# Patient Record
Sex: MALE
[De-identification: names, ages, dates, MRNs, and addresses within clinical notes are randomized per-mention and may not be internally consistent; named-entity substitution may affect disease eponyms.]

---

## 2014-08-08 VITALS
WEIGHT: 163 LBS | HEART RATE: 84 BPM | HEIGHT: 68.75 IN | SYSTOLIC BLOOD PRESSURE: 120 MMHG | DIASTOLIC BLOOD PRESSURE: 76 MMHG | BODY MASS INDEX: 24.14 KG/M2

## 2017-12-21 ENCOUNTER — APPOINTMENT (OUTPATIENT)
Dept: INTERNAL MEDICINE | Facility: CLINIC | Age: 22
End: 2017-12-21
Payer: MEDICAID

## 2017-12-21 VITALS
BODY MASS INDEX: 22.9 KG/M2 | HEIGHT: 70 IN | WEIGHT: 160 LBS | HEART RATE: 79 BPM | TEMPERATURE: 98.7 F | RESPIRATION RATE: 17 BRPM | OXYGEN SATURATION: 99 % | DIASTOLIC BLOOD PRESSURE: 80 MMHG | SYSTOLIC BLOOD PRESSURE: 118 MMHG

## 2017-12-21 DIAGNOSIS — Z00.00 ENCOUNTER FOR GENERAL ADULT MEDICAL EXAMINATION W/OUT ABNORMAL FINDINGS: ICD-10-CM

## 2017-12-21 DIAGNOSIS — Z63.79 OTHER STRESSFUL LIFE EVENTS AFFECTING FAMILY AND HOUSEHOLD: ICD-10-CM

## 2017-12-21 PROCEDURE — 99385 PREV VISIT NEW AGE 18-39: CPT

## 2017-12-27 LAB
25(OH)D3 SERPL-MCNC: 18.2 NG/ML
ALBUMIN SERPL ELPH-MCNC: 5.1 G/DL
ALP BLD-CCNC: 61 U/L
ALT SERPL-CCNC: 62 U/L
ANION GAP SERPL CALC-SCNC: 16 MMOL/L
APPEARANCE: CLEAR
AST SERPL-CCNC: 24 U/L
BACTERIA: NEGATIVE
BASOPHILS # BLD AUTO: 0.03 K/UL
BASOPHILS NFR BLD AUTO: 0.5 %
BILIRUB SERPL-MCNC: 0.8 MG/DL
BILIRUBIN URINE: NEGATIVE
BLOOD URINE: NEGATIVE
BUN SERPL-MCNC: 16 MG/DL
C TRACH RRNA SPEC QL NAA+PROBE: NOT DETECTED
CALCIUM SERPL-MCNC: 10.4 MG/DL
CHLORIDE SERPL-SCNC: 98 MMOL/L
CHOLEST SERPL-MCNC: 191 MG/DL
CHOLEST/HDLC SERPL: 3.1 RATIO
CO2 SERPL-SCNC: 27 MMOL/L
COLOR: YELLOW
CREAT SERPL-MCNC: 1.13 MG/DL
EOSINOPHIL # BLD AUTO: 0.15 K/UL
EOSINOPHIL NFR BLD AUTO: 2.3 %
GLUCOSE QUALITATIVE U: NEGATIVE MG/DL
GLUCOSE SERPL-MCNC: 80 MG/DL
HAV IGM SER QL: NONREACTIVE
HBA1C MFR BLD HPLC: 5.2 %
HBV CORE IGM SER QL: NONREACTIVE
HBV SURFACE AG SER QL: NONREACTIVE
HCT VFR BLD CALC: 47.2 %
HCV AB SER QL: NONREACTIVE
HCV S/CO RATIO: 0.07 S/CO
HDLC SERPL-MCNC: 62 MG/DL
HGB BLD-MCNC: 15.9 G/DL
HIV1+2 AB SPEC QL IA.RAPID: NONREACTIVE
HYALINE CASTS: 0 /LPF
IMM GRANULOCYTES NFR BLD AUTO: 0.2 %
KETONES URINE: NEGATIVE
LDLC SERPL CALC-MCNC: 102 MG/DL
LEUKOCYTE ESTERASE URINE: NEGATIVE
LYMPHOCYTES # BLD AUTO: 2.88 K/UL
LYMPHOCYTES NFR BLD AUTO: 43.4 %
MAN DIFF?: NORMAL
MCHC RBC-ENTMCNC: 30.6 PG
MCHC RBC-ENTMCNC: 33.7 GM/DL
MCV RBC AUTO: 90.8 FL
MICROSCOPIC-UA: NORMAL
MONOCYTES # BLD AUTO: 0.48 K/UL
MONOCYTES NFR BLD AUTO: 7.2 %
N GONORRHOEA RRNA SPEC QL NAA+PROBE: NOT DETECTED
NEUTROPHILS # BLD AUTO: 3.08 K/UL
NEUTROPHILS NFR BLD AUTO: 46.4 %
NITRITE URINE: NEGATIVE
PH URINE: 7.5
PLATELET # BLD AUTO: 284 K/UL
POTASSIUM SERPL-SCNC: 4.6 MMOL/L
PROT SERPL-MCNC: 8.1 G/DL
PROTEIN URINE: NEGATIVE MG/DL
RBC # BLD: 5.2 M/UL
RBC # FLD: 13.4 %
RED BLOOD CELLS URINE: 0 /HPF
SODIUM SERPL-SCNC: 141 MMOL/L
SOURCE AMPLIFICATION: NORMAL
SPECIFIC GRAVITY URINE: 1.03
SQUAMOUS EPITHELIAL CELLS: 0 /HPF
T PALLIDUM AB SER QL IA: NEGATIVE
TRIGL SERPL-MCNC: 134 MG/DL
UROBILINOGEN URINE: NEGATIVE MG/DL
WBC # FLD AUTO: 6.63 K/UL
WHITE BLOOD CELLS URINE: 0 /HPF

## 2018-02-02 DIAGNOSIS — M79.643 PAIN IN UNSPECIFIED HAND: ICD-10-CM

## 2018-04-15 ENCOUNTER — HOSPITAL ENCOUNTER (EMERGENCY)
Dept: HOSPITAL 25 - ED | Age: 23
Discharge: HOME | End: 2018-04-15
Payer: COMMERCIAL

## 2018-04-15 VITALS — DIASTOLIC BLOOD PRESSURE: 68 MMHG | SYSTOLIC BLOOD PRESSURE: 121 MMHG
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
## 2018-04-15 DIAGNOSIS — S00.83XA: ICD-10-CM
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
## 2018-04-15 DIAGNOSIS — S01.111A: ICD-10-CM

## 2018-04-15 DIAGNOSIS — S62.396A: Primary | ICD-10-CM

## 2018-04-15 PROCEDURE — 99282 EMERGENCY DEPT VISIT SF MDM: CPT

## 2018-04-15 PROCEDURE — 29125 APPL SHORT ARM SPLINT STATIC: CPT

## 2018-04-15 PROCEDURE — 12011 RPR F/E/E/N/L/M 2.5 CM/<: CPT

## 2018-04-15 PROCEDURE — 70486 CT MAXILLOFACIAL W/O DYE: CPT

## 2018-04-15 NOTE — ED
Adult Trauma





- HPI Summary


HPI Summary: 


Patient here with multiple injuries after accidentally falling down 5-10 steps 

at 10:30 this morning.  He reports he was running quickly down the stairs when 

he lost his footing and started to roll forward.  Tried to catch himself with 

his hands and now has a right swollen and painful hand. Pain is worse with 

moving pinkie and is tender to touch the swollen area.  He denies numbness 

tingling or weakness.  He is able to move his wrist and other fingers without 

pain or restriction although he does note some pain with supination.  He is 

able to move his elbow shoulder and neck as well as the other extremity with 

all joints without pain or restriction.  He admits he hit the Rt side of his 

face on the way down also. He denies loss of consciousness, headache, change in 

vision, photophobia, nausea, tinnitus, epistaxis, dental pain or loose teeth, 

tongue or cheek bite injury, chest pain, abdominal pain, lower extremity pain.  

No change in bowel or bladder habits.  He has a laceration over his right eye 

within the lateral edge of the eyebrow - mild bleeding.  Immunizations are up-to

-date.  Denies pain with ocular movements.  He does have mild right-sided jaw 

discomfort with depression and elevation however he states this is improving 

since he's been here.  His Rt cheek is swollen and tender here and remains so.  

He's been applying ice but has not taken any medication -would like to try 

ibuprofen.  No previous history of head injury.





- History of Current Complaint


Chief Complaint: EDHeadInjury


Stated Complaint: EYE/HAND INJURY


Time Seen by Provider: 04/15/18 11:36


Hx Obtained From: Patient


Pain Intensity: 6





- Allergy/Home Medications


Allergies/Adverse Reactions: 


 Allergies











Allergy/AdvReac Type Severity Reaction Status Date / Time


 


No Known Allergies Allergy   Verified 04/15/18 11:38











Home Medications: 


 Home Medications





NK [No Home Medications Reported]  04/15/18 [History Confirmed 04/15/18]











PMH/Surg Hx/FS Hx/Imm Hx


Previously Healthy: Yes


Endocrine/Hematology History: 


   Denies: Hx Anticoagulant Therapy, Hx Blood Disorders, Hx Unexplained Bleeding

, Autoimmune Disease





- Immunization History


Immunizations Up to Date: Yes


Infectious Disease History: No


Infectious Disease History: 


   Denies: Hx of Known/Suspected MRSA, Traveled Outside the [place] in Last 30 Days





- Family History


Known Family History: Positive: Diabetes - father





- Social History


Occupation: Student


Lives: Dormitory/Roommates


Alcohol Use: Occasionally - couple of times a month


Hx Substance Use: No


Substance Use Type: Reports: None


Hx Tobacco Use: No


Smoking Status (MU): Never Smoked Tobacco





Review of Systems


Constitutional: Negative


Negative: Fatigue


Eyes: Negative


Negative: Photophobia, Blurred Vision, Diplopia


ENT: Other - Rt cheek pain


Negative: Epistaxis, Dental Pain, Ear Ache, Nasal Discharge


Cardiovascular: Negative


Respiratory: Negative


Gastrointestinal: Negative


Positive: no symptoms reported


Positive: Arthralgia, Myalgia, Decreased ROM, Edema


Positive: Bruising


Neurological: Negative


Psychological: Normal


All Other Systems Reviewed And Are Negative: Yes





Physical Exam


Triage Information Reviewed: Yes


Vital Signs On Initial Exam: 


 Initial Vitals











Temp Pulse Resp BP Pulse Ox


 


 98 F   96   16   119/79   98 


 


 04/15/18 11:00  04/15/18 11:00  04/15/18 11:00  04/15/18 11:00  04/15/18 11:00











Vital Signs Reviewed: Yes


Appearance: Positive: Well-Appearing, Well-Nourished, Pain Distress - mild


Skin: Positive: Warm, Skin Color Reflects Adequate Perfusion - lacertion oozing 

blood over Rt supraorbital ridge within eyebrow; ecchymosis w/ edema over Rt 

5th MC area - no skin breakdown here


Head/Face: Positive: Other - Rt zygomatic edema - TTP;


Eyes: Positive: Normal, EOMI - No pain with ocular movement, KEKE - No 

photophobia, Conjunctiva Clear


ENT: Positive: Normal ENT inspection, Hearing grossly normal, Pharynx normal, 

TMs normal - No hemotympanum.  Negative: Nasal drainage - No signs of epistaxis


Dental: Negative: Dental Fracture @


Neck: Positive: Supple, Nontender - Full range of motion without pain or 

restriction


Respiratory/Lung Sounds: Positive: Clear to Auscultation, Breath Sounds 

Present.  Negative: Stridor, Tracheal Deviation, Wheezes


Cardiovascular: Positive: Normal, RRR, Pulses are Symmetrical in both Upper and 

Lower Extremities


Abdomen Description: Positive: Nontender, No Organomegaly, Soft


Bowel Sounds: Positive: Present


Musculoskeletal: Positive: Pain @ - Right fifth metacarpal edematous and tender 

to palpationpain with movement of phalanges at the MCP joint - otherwise no 

gross deformity about the phalange remaining in the anatomy of the hand or wrist

; mild discomfort with supination otherwise full range of motion of wrist 

without restriction; full range of motion elbow and shoulder without pain or 

restriction in both of these areas including bone in between nontender to 

palpation and without edema or deformity


Neurological: Positive: Normal, Sensory/Motor Intact, Alert, Oriented to Person 

Place, Time, CN Intact II-III


Psychiatric: Positive: Normal





Procedures





- Splinting


Location: Rt hand


Hand-Made Type: fiberglass


Splint: ulnar - gutter


Pre-Proc Neuro Vasc Exam: normal


Post-Proc Neuro Vasc Exam: normal





- Laceration/Wound Repair


  ** 1


Location: face - Rt eyebrow


Description: Linear


Anesthesia: Local, Lido


Length, Depth and Shape: 2cm x 3 mm


Betadine Prep?: No - chlorhexadine


Irrigated w/ Saline (ccs): 250


Laceration/Wound Explored: clean


Closure: Single Layer


Suture Type: Prolene - 6-0


Number of Sutures: 4


Layer Closure?: No


Sterile Dressing Applied?: Yes - triple anbx ointment -hemodynamically stable - 

patient tolerated well





Diagnostics





- Vital Signs


 Vital Signs











  Temp Pulse Resp BP Pulse Ox


 


 04/15/18 11:00  98 F  96  16  119/79  98














- Laboratory


Lab Statement: Any lab studies that have been ordered have been reviewed, and 

results considered in the medical decision making process.





Re-Evaluation





- Re-Evaluation


  ** First Eval


Change: Improved





Adult Trauma Course/Dx





- Course


Course Of Treatment: Right hand x-ray reveals distal fifth metacarpal fracture 

with about 34 dorsal angulation.  Neurovascularly intact.  CT report reveals 

soft tissue edema but no fracture, dislocation or hemorrhage





- Diagnoses


Provider Diagnoses: 


 Fall down stairs, Facial contusion, Laceration of eyebrow, right, Closed 

fracture of fifth metacarpal bone of right hand








Discharge





- Sign-Out/Discharge


Documenting (check all that apply): Discharge





- Discharge Plan


Condition: Stable


Disposition: HOME


Patient Education Materials:  Care For Your Stitches (ED), Hand Fracture (ED), 

Splint Care (ED), Facial Laceration (ED)


Forms:  *School Release


Referrals: 


Nadya Devries MD [Medical Doctor] - 


[facility] [place] - Rizwan GILLILAND [Medical Doctor] - 


Additional Instructions: 


Facial injury:


Gently wash wound with soap and water, rinse well and pat dry with clean cloth.

  Reapply triple antibiotic ointment and clean gauze dressing.  Continue this 

daily until sutures are removed in 5 days.  Call your [facility] [place] to 

schedule wound recheck and suture removal in 5 days. 





For pain and swelling, you may apply ice and take ibuprofen with food as needed 

for pain.





* If you develop redness, swelling, streaking, purulent drainage, fevers or 

chills, seek medical attention sooner or return to the emergency department.














Hand injury:


REST, ICE, ELEVATE AND KEEP SPLINT CLEAN, DRY AND IN PLACE UNTIL SEEN BY 

ORTHOPEDICS. Call orthopedics tomorrow to schedule follow-up. Contact 

information included here.





You may take ibuprofen alternating with acetaminophen as needed for pain





*If you develop numbness, tingling, weakness, swelling or skin discoloration, 

loosen ACE wrap and elevate arm for 20 minutes. If symptoms persist, return to 

ED








- Billing Disposition and Condition


Condition: STABLE


Disposition: HOME

## 2018-04-15 NOTE — RAD
indication:  Swelling and pain of the right zygoma after falling down a flight of stairs.



COMPARISON:  None



A CT scan of the maxillofacial bones was performed without intravenous contrast

enhancement.  Contiguous axial sections were obtained from the level of the hyoid bone to

just above the frontal sinuses.



Findings:



There is mild induration of the subcutaneous tissues surrounding the right orbit and

overlying the right zygoma.



Bones: There is no displaced fracture or dislocation. The orbital rim is intact.

Bilaterally the nasal bones are intact. The zygomatic arch is intact. The pterygoid plates

are intact.



Orbits: The globes are round. The optic nerves are symmetric. The extraocular musculature

is normal. There is no post septal or intraconal inflammatory change. There is no

retrobulbar hematoma.



Paranasal Sinuses: There is mild mucosal thickening of the right worse than left maxillary

sinuses.



Visualized brain: The limited views of the brain do not demonstrate any acute abnormality

or extra-axial hemorrhage.



IMPRESSION:

Soft tissue swelling and induration surrounding the right orbit and overlying the right

zygoma without underlying fracture or dislocation.

## 2018-04-15 NOTE — RAD
INDICATION: Right hand pain after a fall down the stairs



COMPARISON: None.



TECHNIQUE: 4 views of the right hand were obtained.



FINDINGS:



Depicted best on the oblique view images there is fracture at the distal metaphysis of the

right fifth metacarpal exhibiting approximately 34 degrees of dorsal angulation. The

remaining visualized bones are intact and appropriately aligned.





IMPRESSION:  Fracture at the distal pole of the right fifth metacarpal.

## 2018-04-18 PROBLEM — M79.643 HAND PAIN: Status: ACTIVE | Noted: 2018-04-18

## 2018-04-20 ENCOUNTER — APPOINTMENT (OUTPATIENT)
Dept: ORTHOPEDIC SURGERY | Facility: CLINIC | Age: 23
End: 2018-04-20

## 2018-04-23 ENCOUNTER — APPOINTMENT (OUTPATIENT)
Dept: ORTHOPEDIC SURGERY | Facility: CLINIC | Age: 23
End: 2018-04-23

## 2018-04-23 ENCOUNTER — APPOINTMENT (OUTPATIENT)
Dept: ORTHOPEDIC SURGERY | Facility: CLINIC | Age: 23
End: 2018-04-23
Payer: MEDICAID

## 2018-04-23 VITALS — WEIGHT: 160 LBS | HEIGHT: 70 IN | BODY MASS INDEX: 22.9 KG/M2

## 2018-04-23 VITALS — BODY MASS INDEX: 24.25 KG/M2 | WEIGHT: 160 LBS | HEIGHT: 68 IN

## 2018-04-23 VITALS — SYSTOLIC BLOOD PRESSURE: 124 MMHG | HEART RATE: 69 BPM | DIASTOLIC BLOOD PRESSURE: 80 MMHG

## 2018-04-23 DIAGNOSIS — Z78.9 OTHER SPECIFIED HEALTH STATUS: ICD-10-CM

## 2018-04-23 DIAGNOSIS — Z60.2 PROBLEMS RELATED TO LIVING ALONE: ICD-10-CM

## 2018-04-23 DIAGNOSIS — S62.336A DISPLACED FRACTURE OF NECK OF FIFTH METACARPAL BONE, RIGHT HAND, INITIAL ENCOUNTER FOR CLOSED FRACTURE: ICD-10-CM

## 2018-04-23 PROCEDURE — 73130 X-RAY EXAM OF HAND: CPT | Mod: RT

## 2018-04-23 PROCEDURE — 26600 TREAT METACARPAL FRACTURE: CPT | Mod: F9

## 2018-04-23 PROCEDURE — 99204 OFFICE O/P NEW MOD 45 MIN: CPT | Mod: 57

## 2018-04-23 SDOH — SOCIAL STABILITY - SOCIAL INSECURITY: PROBLEMS RELATED TO LIVING ALONE: Z60.2

## 2019-03-22 ENCOUNTER — RECORD ABSTRACTING (OUTPATIENT)
Age: 24
End: 2019-03-22

## 2019-10-02 PROBLEM — Z60.2 PERSON LIVING ALONE: Status: ACTIVE | Noted: 2018-04-23
